# Patient Record
Sex: FEMALE | URBAN - METROPOLITAN AREA
[De-identification: names, ages, dates, MRNs, and addresses within clinical notes are randomized per-mention and may not be internally consistent; named-entity substitution may affect disease eponyms.]

---

## 2020-10-27 ENCOUNTER — OFFICE VISIT (OUTPATIENT)
Dept: URBAN - METROPOLITAN AREA CLINIC 13 | Facility: CLINIC | Age: 60
End: 2020-10-27
Payer: MEDICAID

## 2020-10-27 DIAGNOSIS — E11.3512 TYPE 2 DIABETES MELLITUS WITH PROLIFERATIVE DIABETIC RETINOPATHY WITH MACULAR EDEMA, LEFT EYE: ICD-10-CM

## 2020-10-27 PROCEDURE — 92134 CPTRZ OPH DX IMG PST SGM RTA: CPT | Performed by: OPHTHALMOLOGY

## 2020-10-27 PROCEDURE — 92014 COMPRE OPH EXAM EST PT 1/>: CPT | Performed by: OPHTHALMOLOGY

## 2020-10-27 PROCEDURE — 92235 FLUORESCEIN ANGRPH MLTIFRAME: CPT | Performed by: OPHTHALMOLOGY

## 2020-10-27 ASSESSMENT — INTRAOCULAR PRESSURE
OS: 18
OD: 17

## 2020-10-27 NOTE — IMPRESSION/PLAN
Impression: Puckering of macula, right eye: H35.371. Plan: There is an ERM on the surface of the retina. This is not affecting the fovea. No change seen since last visit. Will follow for thickening. Observe for now.

## 2020-10-27 NOTE — IMPRESSION/PLAN
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 OD. S/p PPV, MP, EL, intravitreal avastin x TRD OD 4/17/18 (SI) Plan: Retina attached on exam.  No new RD/RT x 360 deg.

## 2020-10-27 NOTE — IMPRESSION/PLAN
Impression: Unspecified pterygium of eye, bilateral: H11.003. Plan: Patient notes irritation has improved with use of ATs. Recommend patient continue with ATs PRN.

## 2020-10-27 NOTE — IMPRESSION/PLAN
Impression: Type 2 diab with prolif diab rtnop with macular edema, bi: [id number].
-s/p Avastin OU last 07/28/2020
-s/p Ozurdex OU last 04/16/19
-s/p fi PRP OD 2/4/2020, PRP OS #1/3 5/5/2020
-s/p PRP OS 05/05/2020 Plan: Exam and imaging demonstrate PDR with improved DME. IVFA from 10/27/2020 shows non perfusion OS>OD with improved NVE OS>OD s/p PRP OS. Recommend Avastin injection OU today to prevent worsening of the diabetic retinopathy. R/B/A's discussed. Patient understands and elects to proceed. Intravitreal injection of Avastin administered OU without complication.   

RTC: 2 months re-eval OCT OU, poss Avastin

## 2020-12-29 ENCOUNTER — OFFICE VISIT (OUTPATIENT)
Dept: URBAN - METROPOLITAN AREA CLINIC 13 | Facility: CLINIC | Age: 60
End: 2020-12-29
Payer: MEDICAID

## 2020-12-29 PROCEDURE — 92012 INTRM OPH EXAM EST PATIENT: CPT | Performed by: OPHTHALMOLOGY

## 2020-12-29 PROCEDURE — 92134 CPTRZ OPH DX IMG PST SGM RTA: CPT | Performed by: OPHTHALMOLOGY

## 2020-12-29 ASSESSMENT — INTRAOCULAR PRESSURE
OS: 18
OD: 20

## 2020-12-29 NOTE — IMPRESSION/PLAN
Impression: Type 2 diab with prolif diab rtnop with macular edema, bi: L50.1951.
-s/p Avastin OU last 10/27/2020
-s/p Ozurdex OU last 04/16/19
-s/p fi PRP OD 2/4/2020, PRP OS #1/3 5/5/2020
-s/p PRP OS 05/05/2020 Plan: Exam and OCT demonstrate PDR with worse DME after extending to 8 wks; patient has subjective decline over the past 2 wks with increased redness. IVFA from 10/27/2020 shows non perfusion OS>OD with improved NVE OS>OD s/p PRP OS. Recommend Avastin injection OU today to prevent worsening of the diabetic retinopathy and tapering to injections q 6 wks. R/B/A's discussed. Patient understands and elects to proceed. Intravitreal injection of Avastin administered OU without complication.   

RTC: 6 wks straight Avastin OU #1/3

## 2020-12-29 NOTE — IMPRESSION/PLAN
Impression: Unspecified pterygium of eye, bilateral: H11.003. Plan: Patient notes irritation has improved with use of ATs. Redness is worse. Recommend switch to PFATs PRN.

## 2020-12-29 NOTE — IMPRESSION/PLAN
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 OD. S/p PPV, MP, EL, intravitreal avastin x TRD OD 4/17/18 (SI) Plan: Retina attached on exam.  No new RD/RT x 360 deg.

## 2021-02-09 ENCOUNTER — PROCEDURE (OUTPATIENT)
Dept: URBAN - METROPOLITAN AREA CLINIC 13 | Facility: CLINIC | Age: 61
End: 2021-02-09
Payer: MEDICAID

## 2021-02-09 PROCEDURE — 92134 CPTRZ OPH DX IMG PST SGM RTA: CPT | Performed by: OPHTHALMOLOGY

## 2021-02-09 ASSESSMENT — INTRAOCULAR PRESSURE
OD: 14
OS: 12

## 2021-03-23 ENCOUNTER — PROCEDURE (OUTPATIENT)
Dept: URBAN - METROPOLITAN AREA CLINIC 13 | Facility: CLINIC | Age: 61
End: 2021-03-23
Payer: MEDICAID

## 2021-03-23 PROCEDURE — 92134 CPTRZ OPH DX IMG PST SGM RTA: CPT | Performed by: OPHTHALMOLOGY

## 2021-03-23 ASSESSMENT — INTRAOCULAR PRESSURE
OD: 17
OS: 15

## 2021-05-04 ENCOUNTER — PROCEDURE (OUTPATIENT)
Dept: URBAN - METROPOLITAN AREA CLINIC 13 | Facility: CLINIC | Age: 61
End: 2021-05-04
Payer: MEDICAID

## 2021-05-04 PROCEDURE — 92134 CPTRZ OPH DX IMG PST SGM RTA: CPT | Performed by: OPHTHALMOLOGY

## 2021-05-04 ASSESSMENT — INTRAOCULAR PRESSURE
OD: 18
OS: 18

## 2021-06-15 ENCOUNTER — OFFICE VISIT (OUTPATIENT)
Dept: URBAN - METROPOLITAN AREA CLINIC 13 | Facility: CLINIC | Age: 61
End: 2021-06-15
Payer: MEDICAID

## 2021-06-15 PROCEDURE — 92014 COMPRE OPH EXAM EST PT 1/>: CPT | Performed by: OPHTHALMOLOGY

## 2021-06-15 PROCEDURE — 92134 CPTRZ OPH DX IMG PST SGM RTA: CPT | Performed by: OPHTHALMOLOGY

## 2021-06-15 ASSESSMENT — INTRAOCULAR PRESSURE
OD: 21
OS: 24

## 2021-06-15 NOTE — IMPRESSION/PLAN
Impression: Type 2 diab with prolif diab rtnop with macular edema, bi: [id number].
-s/p Avastin OU last 5/4/2021
worse DME after extending to 8 wks in the past
-s/p Ozurdex OU last 04/16/19
-s/p fi PRP OD 2/4/2020, PRP OS #1/3 5/5/2020
-s/p PRP OS 05/05/2020 Plan: Exam and OCT demonstrate PDR with ; patient has subjective decline over the past 2 wks with increased redness. IVFA from 10/27/2020 shows non perfusion OS>OD with improved NVE OS>OD s/p PRP OS. Will obtain Optos FA at next visit. Recommend Avastin injection OU today to prevent worsening of the diabetic retinopathy and tapering to injections q 6 wks. R/B/A's discussed. Patient understands and elects to proceed. Intravitreal injection of Avastin administered OU without complication.   

RTC: 6 wks OCT OU; STRAIGHT AVASTIN OU #1/3,

## 2021-06-15 NOTE — IMPRESSION/PLAN
Impression: Glaucoma: H40.9. Plan: Exam demonstrates borderline IOP. Fortunately, there is no NVI today. ON appears healthy. Discussed surgery vs laser vs gtts vs observation.   Recommend CPM

## 2021-06-15 NOTE — IMPRESSION/PLAN
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 OD. S/p PPV, MP, EL, intravitreal avastin x TRD OD 4/17/18 (SI) Plan: Retina attached on exam.  No new RD/RT x 360 deg.

## 2021-06-15 NOTE — IMPRESSION/PLAN
Impression: Puckering of macula, both eyes: H35.373 Plan: There is an ERM on the surface of the retina OS>OD. This is not affecting the fovea. No change seen since last visit. Will follow for thickening. Observe for now.

## 2021-06-29 ENCOUNTER — OFFICE VISIT (OUTPATIENT)
Dept: URBAN - METROPOLITAN AREA CLINIC 13 | Facility: CLINIC | Age: 61
End: 2021-06-29
Payer: MEDICAID

## 2021-06-29 DIAGNOSIS — Z96.1 PRESENCE OF INTRAOCULAR LENS: ICD-10-CM

## 2021-06-29 PROCEDURE — 92012 INTRM OPH EXAM EST PATIENT: CPT | Performed by: OPHTHALMOLOGY

## 2021-06-29 PROCEDURE — 92134 CPTRZ OPH DX IMG PST SGM RTA: CPT | Performed by: OPHTHALMOLOGY

## 2021-06-29 ASSESSMENT — INTRAOCULAR PRESSURE
OS: 14
OD: 14

## 2021-06-29 NOTE — IMPRESSION/PLAN
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 OD. S/p PPV, MP, EL, intravitreal avastin x TRD OD 4/17/18 (SI) Plan: Retina attached on exam.  No new RD/RT x 360 deg.

## 2021-06-29 NOTE — IMPRESSION/PLAN
Impression: Type 2 diab with prolif diab rtnop with macular edema, bi: P11.4846.
-s/p Avastin OU last 06/15/2021
worse DME after extending to 8 wks in the past
-s/p Ozurdex OU last 04/16/19
-s/p fi PRP OD 2/4/2020, PRP OS #1/3 5/5/2020
-s/p PRP OS 05/05/2020 Plan: Patient notes blurry vision since last injection. Exam demonstrates PEE. No evidence of infection or worsening VH or worsening of [facility] Ats. Reassurance provided.   

RTC as scheduled

## 2021-07-27 ENCOUNTER — PROCEDURE (OUTPATIENT)
Dept: URBAN - METROPOLITAN AREA CLINIC 13 | Facility: CLINIC | Age: 61
End: 2021-07-27
Payer: MEDICAID

## 2021-07-27 PROCEDURE — 92134 CPTRZ OPH DX IMG PST SGM RTA: CPT | Performed by: OPHTHALMOLOGY

## 2021-07-27 ASSESSMENT — INTRAOCULAR PRESSURE
OS: 15
OD: 15

## 2021-09-07 ENCOUNTER — PROCEDURE (OUTPATIENT)
Dept: URBAN - METROPOLITAN AREA CLINIC 13 | Facility: CLINIC | Age: 61
End: 2021-09-07
Payer: MEDICAID

## 2021-09-07 PROCEDURE — 92134 CPTRZ OPH DX IMG PST SGM RTA: CPT | Performed by: OPHTHALMOLOGY

## 2021-09-07 ASSESSMENT — INTRAOCULAR PRESSURE
OD: 22
OS: 21

## 2021-10-19 ENCOUNTER — PROCEDURE (OUTPATIENT)
Dept: URBAN - METROPOLITAN AREA CLINIC 13 | Facility: CLINIC | Age: 61
End: 2021-10-19
Payer: MEDICAID

## 2021-10-19 PROCEDURE — 92134 CPTRZ OPH DX IMG PST SGM RTA: CPT | Performed by: OPHTHALMOLOGY

## 2021-10-19 ASSESSMENT — INTRAOCULAR PRESSURE
OD: 18
OS: 17

## 2021-11-30 ENCOUNTER — OFFICE VISIT (OUTPATIENT)
Dept: URBAN - METROPOLITAN AREA CLINIC 13 | Facility: CLINIC | Age: 61
End: 2021-11-30
Payer: MEDICAID

## 2021-11-30 DIAGNOSIS — H40.9 GLAUCOMA: ICD-10-CM

## 2021-11-30 DIAGNOSIS — E11.3521 TYPE 2 DIAB W PROLIF DIAB RTNOP W TRCTN DTCH MACULA, R EYE: ICD-10-CM

## 2021-11-30 DIAGNOSIS — H35.371 PUCKERING OF MACULA, RIGHT EYE: ICD-10-CM

## 2021-11-30 PROCEDURE — 92012 INTRM OPH EXAM EST PATIENT: CPT | Performed by: OPHTHALMOLOGY

## 2021-11-30 PROCEDURE — 92134 CPTRZ OPH DX IMG PST SGM RTA: CPT | Performed by: OPHTHALMOLOGY

## 2021-11-30 ASSESSMENT — INTRAOCULAR PRESSURE
OS: 16
OD: 19

## 2021-11-30 NOTE — IMPRESSION/PLAN
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 OD. S/p PPV, MP, EL, intravitreal avastin x TRD OD 4/17/18 (SI) Plan: Retina attached on exam.  No new RD/RT x 360 deg.

## 2021-11-30 NOTE — IMPRESSION/PLAN
Impression: Type 2 diab with prolif diab rtnop with macular edema, bi: [id number].
-s/p Avastin OU last 5/4/2021
worse DME after extending to 8 wks in the past
-s/p Ozurdex OU last 04/16/19
-s/p fi PRP OD 2/4/2020, PRP OS #1/3 5/5/2020
-s/p PRP OS 05/05/2020 Plan: Exam and OCT demonstrate PDR with ; patient has subjective decline over the past 2 wks with increased redness. IVFA from 10/27/2020 shows non perfusion OS>OD with improved NVE OS>OD s/p PRP OS. Recommend Avastin injection OU today to prevent worsening of the diabetic retinopathy and tapering to injections q 4-5 wks. R/B/A's discussed. Patient understands and elects to proceed. Intravitreal injection of Avastin administered OU without complication.   

RTC: 4-5 wks OCT OU; STRAIGHT AVASTIN OU #1/3, after series, next eval will be with Optos FA OS>OD

## 2022-01-04 ENCOUNTER — PROCEDURE (OUTPATIENT)
Dept: URBAN - METROPOLITAN AREA CLINIC 13 | Facility: CLINIC | Age: 62
End: 2022-01-04
Payer: MEDICAID

## 2022-01-04 PROCEDURE — 92134 CPTRZ OPH DX IMG PST SGM RTA: CPT | Performed by: OPHTHALMOLOGY

## 2022-01-04 ASSESSMENT — INTRAOCULAR PRESSURE
OS: 19
OD: 19

## 2022-02-01 ENCOUNTER — PROCEDURE (OUTPATIENT)
Dept: URBAN - METROPOLITAN AREA CLINIC 13 | Facility: CLINIC | Age: 62
End: 2022-02-01
Payer: MEDICAID

## 2022-02-01 PROCEDURE — 92134 CPTRZ OPH DX IMG PST SGM RTA: CPT | Performed by: OPHTHALMOLOGY

## 2022-02-01 ASSESSMENT — INTRAOCULAR PRESSURE
OS: 14
OD: 14

## 2022-03-01 ENCOUNTER — PROCEDURE (OUTPATIENT)
Dept: URBAN - METROPOLITAN AREA CLINIC 13 | Facility: CLINIC | Age: 62
End: 2022-03-01
Payer: MEDICAID

## 2022-03-01 PROCEDURE — 92134 CPTRZ OPH DX IMG PST SGM RTA: CPT | Performed by: OPHTHALMOLOGY

## 2022-03-01 ASSESSMENT — INTRAOCULAR PRESSURE
OD: 18
OS: 18

## 2022-03-29 ENCOUNTER — OFFICE VISIT (OUTPATIENT)
Dept: URBAN - METROPOLITAN AREA CLINIC 13 | Facility: CLINIC | Age: 62
End: 2022-03-29
Payer: MEDICAID

## 2022-03-29 DIAGNOSIS — E11.3513 TYPE 2 DIAB WITH PROLIF DIAB RTNOP WITH MACULAR EDEMA, BI: ICD-10-CM

## 2022-03-29 DIAGNOSIS — H11.003 UNSPECIFIED PTERYGIUM OF EYE, BILATERAL: Primary | ICD-10-CM

## 2022-03-29 PROCEDURE — 92235 FLUORESCEIN ANGRPH MLTIFRAME: CPT | Performed by: OPHTHALMOLOGY

## 2022-03-29 PROCEDURE — 99214 OFFICE O/P EST MOD 30 MIN: CPT | Performed by: OPHTHALMOLOGY

## 2022-03-29 PROCEDURE — 92134 CPTRZ OPH DX IMG PST SGM RTA: CPT | Performed by: OPHTHALMOLOGY

## 2022-03-29 PROCEDURE — 92250 FUNDUS PHOTOGRAPHY W/I&R: CPT | Performed by: OPHTHALMOLOGY

## 2022-03-29 ASSESSMENT — INTRAOCULAR PRESSURE
OD: 17
OS: 14

## 2022-03-29 NOTE — IMPRESSION/PLAN
Impression: Type 2 diab with prolif diab rtnop with macular edema, bi: L46.8057.
-s/p Avastin OU last 5/4/2021
worse DME after extending to 8 wks in the past
-s/p Ozurdex OU last 04/16/19
-s/p fi PRP OD 2/4/2020, PRP OS #1/3 5/5/2020
-s/p PRP OS 05/05/2020 Plan: Exam and OCT demonstrate PDR with improving DME. IVFA from 3/29/2022 shows non perfusion OS>OD with improved NVE OS>OD s/p PRP OU. Recommend Avastin injection OU today to prevent worsening of the diabetic retinopathy and tapering to injections q 4-5 wks. R/B/A's discussed. Patient understands and elects to proceed. Intravitreal injection of Avastin administered OU without complication.   

RTC: 4-5 wks OCT OU; STRAIGHT AVASTIN OU #1/3

## 2022-03-29 NOTE — IMPRESSION/PLAN
Impression: Puckering of macula, both eyes: H35.373 Plan: There is an ERM on the surface of the retina OS>OD. This is not affecting the fovea. No change seen since last visit. Will follow for thickening. Continue observation for now.

## 2022-03-29 NOTE — IMPRESSION/PLAN
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 OD. S/p PPV, MP, EL, intravitreal avastin x TRD OD 4/17/18 (SI) Plan: Retina attached on exam.  No new RD/RT x 360 deg.

## 2022-05-03 ENCOUNTER — PROCEDURE (OUTPATIENT)
Dept: URBAN - METROPOLITAN AREA CLINIC 13 | Facility: CLINIC | Age: 62
End: 2022-05-03
Payer: MEDICAID

## 2022-05-03 DIAGNOSIS — E11.3513 TYPE 2 DIABETES MELLITUS WITH PROLIFERATIVE DIABETIC RETINOPATHY WITH MACULAR EDEMA, BILATERAL: Primary | ICD-10-CM

## 2022-05-03 PROCEDURE — 92134 CPTRZ OPH DX IMG PST SGM RTA: CPT | Performed by: OPHTHALMOLOGY

## 2022-05-03 ASSESSMENT — INTRAOCULAR PRESSURE
OD: 14
OS: 14

## 2022-05-31 ENCOUNTER — PROCEDURE (OUTPATIENT)
Dept: URBAN - METROPOLITAN AREA CLINIC 13 | Facility: CLINIC | Age: 62
End: 2022-05-31
Payer: MEDICAID

## 2022-05-31 DIAGNOSIS — E11.3513 TYPE 2 DIABETES MELLITUS WITH PROLIFERATIVE DIABETIC RETINOPATHY WITH MACULAR EDEMA, BILATERAL: Primary | ICD-10-CM

## 2022-05-31 PROCEDURE — 92134 CPTRZ OPH DX IMG PST SGM RTA: CPT | Performed by: OPHTHALMOLOGY

## 2022-05-31 ASSESSMENT — INTRAOCULAR PRESSURE
OD: 14
OS: 12

## 2022-07-05 ENCOUNTER — PROCEDURE (OUTPATIENT)
Dept: URBAN - METROPOLITAN AREA CLINIC 13 | Facility: CLINIC | Age: 62
End: 2022-07-05
Payer: MEDICAID

## 2022-07-05 DIAGNOSIS — E11.3513 TYPE 2 DIABETES MELLITUS WITH PROLIFERATIVE DIABETIC RETINOPATHY WITH MACULAR EDEMA, BILATERAL: Primary | ICD-10-CM

## 2022-07-05 PROCEDURE — 92134 CPTRZ OPH DX IMG PST SGM RTA: CPT | Performed by: OPHTHALMOLOGY

## 2022-07-05 ASSESSMENT — INTRAOCULAR PRESSURE
OD: 17
OS: 15

## 2022-08-02 ENCOUNTER — OFFICE VISIT (OUTPATIENT)
Dept: URBAN - METROPOLITAN AREA CLINIC 13 | Facility: CLINIC | Age: 62
End: 2022-08-02
Payer: MEDICAID

## 2022-08-02 DIAGNOSIS — H35.373 PUCKERING OF MACULA, BILATERAL: ICD-10-CM

## 2022-08-02 DIAGNOSIS — H43.812 VITREOUS DEGENERATION, LEFT EYE: Primary | ICD-10-CM

## 2022-08-02 DIAGNOSIS — H11.003 UNSPECIFIED PTERYGIUM OF EYE, BILATERAL: ICD-10-CM

## 2022-08-02 DIAGNOSIS — H40.9 GLAUCOMA: ICD-10-CM

## 2022-08-02 DIAGNOSIS — E11.3521 TYPE 2 DIAB W PROLIF DIAB RTNOP W TRCTN DTCH MACULA, R EYE: ICD-10-CM

## 2022-08-02 DIAGNOSIS — E11.3513 TYPE 2 DIAB WITH PROLIF DIAB RTNOP WITH MACULAR EDEMA, BI: ICD-10-CM

## 2022-08-02 PROCEDURE — 92134 CPTRZ OPH DX IMG PST SGM RTA: CPT | Performed by: OPHTHALMOLOGY

## 2022-08-02 PROCEDURE — 92014 COMPRE OPH EXAM EST PT 1/>: CPT | Performed by: OPHTHALMOLOGY

## 2022-08-02 ASSESSMENT — INTRAOCULAR PRESSURE
OS: 18
OD: 15

## 2022-08-02 NOTE — IMPRESSION/PLAN
Impression: Puckering of macula, bilateral: H35.373. Plan: There is an ERM on the surface of the retina OS>OD. This is not affecting the fovea. No change seen since last visit. Will follow for thickening. Continue observation for now.

## 2022-08-02 NOTE — IMPRESSION/PLAN
Impression: Glaucoma Suspect: H40.9. Plan: Exam demonstrates possible mild glaucomatous cupping. Fortunately, there is no NVI today. IOP is WNL and there is no progression of ON cupping. Discussed surgery vs laser vs gtts vs observation.   Recommend eval

## 2022-08-02 NOTE — IMPRESSION/PLAN
Impression: Type 2 diab with prolif diab rtnop with macular edema, bi: J03.6347.
-s/p Avastin OU last 07/05/2022
worse DME after extending to 8 wks in the past
-s/p Ozurdex OU last 04/16/19
-s/p fi PRP OD 2/4/2020, PRP OS #1/3 5/5/2020
-s/p PRP OS 05/05/2020 Plan: Exam and OCT demonstrate PDR with improving DME. IVFA from 3/29/2022 shows non perfusion OS>OD with improved NVE OS>OD s/p PRP OU. Recommend Avastin injection OU today to prevent worsening of the diabetic retinopathy and tapering to injections q 4-5 wks. R/B/A's discussed. Patient understands and elects to proceed. Intravitreal injection of Avastin administered OU without complication.   

RTC: 4-5 wks OCT OU; STRAIGHT AVASTIN OU #1/3

## 2022-08-02 NOTE — IMPRESSION/PLAN
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 OD. S/p PPV, MP, EL, intravitreal avastin x TRD OD 4/17/18 (SI) Plan: Retina attached on exam.  No new RD/RT x 360 deg.

## 2022-08-02 NOTE — IMPRESSION/PLAN
Impression: Vitreous degeneration, left eye: H43.812. Plan: Patient notes floaters since last injection. Exam demonstrates a PVD without any RD or RT on exam.  Discussed R/B/A of PPV vs observation for the floaters. The floaters are not debilitating and so observation was recommended. Reassurance provided. RDW discussed. Precautions discussed with the patient.

## 2022-08-30 ENCOUNTER — PROCEDURE (OUTPATIENT)
Dept: URBAN - METROPOLITAN AREA CLINIC 13 | Facility: CLINIC | Age: 62
End: 2022-08-30
Payer: MEDICAID

## 2022-08-30 DIAGNOSIS — E11.3513 TYPE 2 DIABETES MELLITUS WITH PROLIFERATIVE DIABETIC RETINOPATHY WITH MACULAR EDEMA, BILATERAL: Primary | ICD-10-CM

## 2022-08-30 ASSESSMENT — INTRAOCULAR PRESSURE
OS: 12
OD: 13

## 2022-09-27 ENCOUNTER — PROCEDURE (OUTPATIENT)
Dept: URBAN - METROPOLITAN AREA CLINIC 13 | Facility: CLINIC | Age: 62
End: 2022-09-27
Payer: MEDICAID

## 2022-09-27 DIAGNOSIS — E11.3513 TYPE 2 DIABETES MELLITUS WITH PROLIFERATIVE DIABETIC RETINOPATHY WITH MACULAR EDEMA, BILATERAL: Primary | ICD-10-CM

## 2022-09-27 PROCEDURE — 92134 CPTRZ OPH DX IMG PST SGM RTA: CPT | Performed by: OPHTHALMOLOGY

## 2022-09-27 ASSESSMENT — INTRAOCULAR PRESSURE
OS: 13
OD: 15

## 2022-10-25 ENCOUNTER — PROCEDURE (OUTPATIENT)
Dept: URBAN - METROPOLITAN AREA CLINIC 13 | Facility: CLINIC | Age: 62
End: 2022-10-25
Payer: MEDICAID

## 2022-10-25 DIAGNOSIS — E11.3513 TYPE 2 DIABETES MELLITUS WITH PROLIFERATIVE DIABETIC RETINOPATHY WITH MACULAR EDEMA, BILATERAL: Primary | ICD-10-CM

## 2022-10-25 PROCEDURE — 67028 INJECTION EYE DRUG: CPT | Performed by: OPHTHALMOLOGY

## 2022-10-25 PROCEDURE — 92134 CPTRZ OPH DX IMG PST SGM RTA: CPT | Performed by: OPHTHALMOLOGY

## 2022-10-25 ASSESSMENT — INTRAOCULAR PRESSURE
OS: 12
OD: 14

## 2022-11-22 ENCOUNTER — OFFICE VISIT (OUTPATIENT)
Dept: URBAN - METROPOLITAN AREA CLINIC 13 | Facility: CLINIC | Age: 62
End: 2022-11-22
Payer: MEDICAID

## 2022-11-22 DIAGNOSIS — H11.003 UNSPECIFIED PTERYGIUM OF EYE, BILATERAL: ICD-10-CM

## 2022-11-22 DIAGNOSIS — H43.812 VITREOUS DEGENERATION, LEFT EYE: ICD-10-CM

## 2022-11-22 DIAGNOSIS — H40.9 GLAUCOMA: ICD-10-CM

## 2022-11-22 DIAGNOSIS — E11.3513 TYPE 2 DIAB WITH PROLIF DIAB RTNOP WITH MACULAR EDEMA, BI: Primary | ICD-10-CM

## 2022-11-22 DIAGNOSIS — E11.3521 TYPE 2 DIAB W PROLIF DIAB RTNOP W TRCTN DTCH MACULA, R EYE: ICD-10-CM

## 2022-11-22 DIAGNOSIS — H35.373 PUCKERING OF MACULA, BILATERAL: ICD-10-CM

## 2022-11-22 PROCEDURE — 92134 CPTRZ OPH DX IMG PST SGM RTA: CPT | Performed by: OPHTHALMOLOGY

## 2022-11-22 PROCEDURE — 92014 COMPRE OPH EXAM EST PT 1/>: CPT | Performed by: OPHTHALMOLOGY

## 2022-11-22 ASSESSMENT — INTRAOCULAR PRESSURE
OS: 19
OD: 20

## 2022-11-22 NOTE — IMPRESSION/PLAN
Impression: Type 2 diab with prolif diab rtnop with macular edema, bi: H56.1995.
-s/p Avastin OU last 10/25/2022
worse DME after extending to 8 wks in the past
-s/p Ozurdex OU last 04/16/19
-s/p fi PRP OD 2/4/2020, PRP OS #1/3 5/5/2020
-s/p PRP OS 05/05/2020 Plan: Exam and OCT demonstrate PDR with improving DME. IVFA from 3/29/2022 shows non perfusion OS>OD with improved NVE OS>OD s/p PRP OU. Recommend Avastin injection OU today to prevent worsening of the diabetic retinopathy and tapering to injections q 4-5 wks. R/B/A's discussed. Patient understands and elects to proceed. Intravitreal injection of Avastin administered OU without complication.   

RTC: 4-5 wks OCT OU; STRAIGHT AVASTIN OU #1/3

## 2022-11-22 NOTE — IMPRESSION/PLAN
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 OD. S/p PPV, MP, EL, intravitreal avastin x TRD OD 4/17/18 (SI) Plan: Retina attached on exam.  No new RD/RT x 360 deg.

## 2022-12-27 ENCOUNTER — PROCEDURE (OUTPATIENT)
Dept: URBAN - METROPOLITAN AREA CLINIC 13 | Facility: CLINIC | Age: 62
End: 2022-12-27
Payer: MEDICAID

## 2022-12-27 DIAGNOSIS — E11.3513 TYPE 2 DIABETES MELLITUS WITH PROLIFERATIVE DIABETIC RETINOPATHY WITH MACULAR EDEMA, BILATERAL: Primary | ICD-10-CM

## 2022-12-27 PROCEDURE — 92134 CPTRZ OPH DX IMG PST SGM RTA: CPT | Performed by: OPHTHALMOLOGY

## 2022-12-27 ASSESSMENT — INTRAOCULAR PRESSURE
OD: 16
OS: 17

## 2023-01-24 ENCOUNTER — PROCEDURE (OUTPATIENT)
Dept: URBAN - METROPOLITAN AREA CLINIC 13 | Facility: CLINIC | Age: 63
End: 2023-01-24
Payer: MEDICAID

## 2023-01-24 DIAGNOSIS — E11.3513 TYPE 2 DIABETES MELLITUS WITH PROLIFERATIVE DIABETIC RETINOPATHY WITH MACULAR EDEMA, BILATERAL: Primary | ICD-10-CM

## 2023-01-24 PROCEDURE — 92134 CPTRZ OPH DX IMG PST SGM RTA: CPT | Performed by: OPHTHALMOLOGY

## 2023-01-24 ASSESSMENT — INTRAOCULAR PRESSURE
OD: 13
OS: 12

## 2023-02-21 ENCOUNTER — OFFICE VISIT (OUTPATIENT)
Dept: URBAN - METROPOLITAN AREA CLINIC 13 | Facility: CLINIC | Age: 63
End: 2023-02-21
Payer: MEDICAID

## 2023-02-21 DIAGNOSIS — E11.3513 TYPE 2 DIABETES MELLITUS WITH PROLIFERATIVE DIABETIC RETINOPATHY WITH MACULAR EDEMA, BILATERAL: Primary | ICD-10-CM

## 2023-02-21 PROCEDURE — 92134 CPTRZ OPH DX IMG PST SGM RTA: CPT | Performed by: OPHTHALMOLOGY

## 2023-02-21 ASSESSMENT — INTRAOCULAR PRESSURE
OS: 15
OD: 18

## 2023-03-28 ENCOUNTER — OFFICE VISIT (OUTPATIENT)
Dept: URBAN - METROPOLITAN AREA CLINIC 13 | Facility: CLINIC | Age: 63
End: 2023-03-28
Payer: MEDICAID

## 2023-03-28 DIAGNOSIS — E11.3513 TYPE 2 DIABETES MELLITUS WITH PROLIFERATIVE DIABETIC RETINOPATHY WITH MACULAR EDEMA, BILATERAL: Primary | ICD-10-CM

## 2023-03-28 DIAGNOSIS — H35.373 PUCKERING OF MACULA, BILATERAL: ICD-10-CM

## 2023-03-28 DIAGNOSIS — E11.3521 TYPE 2 DIAB W PROLIF DIAB RTNOP W TRCTN DTCH MACULA, R EYE: ICD-10-CM

## 2023-03-28 DIAGNOSIS — H11.003 UNSPECIFIED PTERYGIUM OF EYE, BILATERAL: ICD-10-CM

## 2023-03-28 DIAGNOSIS — H40.9 GLAUCOMA: ICD-10-CM

## 2023-03-28 PROCEDURE — 92134 CPTRZ OPH DX IMG PST SGM RTA: CPT | Performed by: OPHTHALMOLOGY

## 2023-03-28 PROCEDURE — 92012 INTRM OPH EXAM EST PATIENT: CPT | Performed by: OPHTHALMOLOGY

## 2023-03-28 ASSESSMENT — INTRAOCULAR PRESSURE
OS: 15
OD: 18

## 2023-03-28 NOTE — IMPRESSION/PLAN
Impression: Unspecified pterygium of eye, bilateral: H11.003. Plan: Patient notes irritation has improved with use of ATs. Redness is improved. Cont switch to PFATs PRN.

## 2023-03-28 NOTE — IMPRESSION/PLAN
Impression: Glaucoma Suspect: H40.9. Plan: Exam demonstrates possible mild glaucomatous cupping. Fortunately, there is no NVI today. IOP is WNL and there is no progression of ON cupping. Discussed surgery vs laser vs gtts vs observation. She was started on drops.  CPM

## 2023-03-28 NOTE — IMPRESSION/PLAN
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 OD. S/p PPV, MP, EL, intravitreal avastin x TRD OD 4/17/18 (SI) Plan: Retina attached on exam.  No new RD/RT x 360 deg.

## 2023-03-28 NOTE — IMPRESSION/PLAN
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
-s/p Avastin OU last 2/21/2023
worse DME after extending to 8 wks in the past
-s/p Ozurdex OU last 04/16/19
-s/p fi PRP OD 2/4/2020, PRP OS #1/3 5/5/2020
-s/p PRP OS 05/05/2020 Plan: Patient notes new VH OS. Exam and OCT demonstrate PDR with persistent DME and tr VH OU. IVFA from 3/29/2022 shows non perfusion OS>OD with improved NVE OS>OD s/p PRP OU. Recommend Avastin injection OU today and switch to [employer] due to worsening DR even on Avastin. R/B/A's discussed. Patient understands and elects to proceed. Intravitreal injection of Avastin administered OU without complication.   

RTC: 4-5 wks OCT OU; STRAIGHT [place] OU #1/3

## 2023-04-25 ENCOUNTER — PROCEDURE (OUTPATIENT)
Dept: URBAN - METROPOLITAN AREA CLINIC 13 | Facility: CLINIC | Age: 63
End: 2023-04-25
Payer: MEDICAID

## 2023-04-25 DIAGNOSIS — E11.3513 TYPE 2 DIABETES MELLITUS WITH PROLIFERATIVE DIABETIC RETINOPATHY WITH MACULAR EDEMA, BILATERAL: Primary | ICD-10-CM

## 2023-04-25 PROCEDURE — 92134 CPTRZ OPH DX IMG PST SGM RTA: CPT | Performed by: OPHTHALMOLOGY

## 2023-04-25 ASSESSMENT — INTRAOCULAR PRESSURE
OD: 16
OS: 13

## 2023-05-23 ENCOUNTER — PROCEDURE (OUTPATIENT)
Dept: URBAN - METROPOLITAN AREA CLINIC 13 | Facility: CLINIC | Age: 63
End: 2023-05-23
Payer: MEDICAID

## 2023-05-23 DIAGNOSIS — E11.3513 TYPE 2 DIABETES MELLITUS WITH PROLIFERATIVE DIABETIC RETINOPATHY WITH MACULAR EDEMA, BILATERAL: Primary | ICD-10-CM

## 2023-05-23 PROCEDURE — 92134 CPTRZ OPH DX IMG PST SGM RTA: CPT | Performed by: OPHTHALMOLOGY

## 2023-05-23 PROCEDURE — 92133 CPTRZD OPH DX IMG PST SGM ON: CPT | Performed by: OPHTHALMOLOGY

## 2023-05-23 ASSESSMENT — INTRAOCULAR PRESSURE
OD: 20
OS: 20

## 2023-06-20 ENCOUNTER — PROCEDURE (OUTPATIENT)
Dept: URBAN - METROPOLITAN AREA CLINIC 13 | Facility: CLINIC | Age: 63
End: 2023-06-20
Payer: MEDICAID

## 2023-06-20 DIAGNOSIS — E11.3513 TYPE 2 DIABETES MELLITUS WITH PROLIFERATIVE DIABETIC RETINOPATHY WITH MACULAR EDEMA, BILATERAL: Primary | ICD-10-CM

## 2023-06-20 PROCEDURE — 92134 CPTRZ OPH DX IMG PST SGM RTA: CPT | Performed by: OPHTHALMOLOGY

## 2023-06-20 ASSESSMENT — INTRAOCULAR PRESSURE
OS: 15
OD: 17

## 2023-07-18 ENCOUNTER — OFFICE VISIT (OUTPATIENT)
Dept: URBAN - METROPOLITAN AREA CLINIC 13 | Facility: CLINIC | Age: 63
End: 2023-07-18
Payer: MEDICAID

## 2023-07-18 DIAGNOSIS — H11.003 UNSPECIFIED PTERYGIUM OF EYE, BILATERAL: ICD-10-CM

## 2023-07-18 DIAGNOSIS — H35.373 PUCKERING OF MACULA, BILATERAL: ICD-10-CM

## 2023-07-18 PROCEDURE — 92014 COMPRE OPH EXAM EST PT 1/>: CPT | Performed by: OPHTHALMOLOGY

## 2023-07-18 PROCEDURE — 92134 CPTRZ OPH DX IMG PST SGM RTA: CPT | Performed by: OPHTHALMOLOGY

## 2023-07-18 ASSESSMENT — INTRAOCULAR PRESSURE
OD: 19
OS: 14

## 2023-07-18 NOTE — IMPRESSION/PLAN
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 OD. S/p PPV, MP, EL, intravitreal avastin x TRD OD 4/17/18 (SI) Plan: Retina attached on exam.  No new RD/RT x 360 deg.

## 2023-07-18 NOTE — IMPRESSION/PLAN
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
-s/p fi PRP OD 2/4/2020
-s/p PRP OS 05/05/2020
-s/p Avastin OU  04/25/2023~worse DME after extending to 8 wks in the past
-s/p Eylea OU 06/20/2023
-s/p Ozurdex OU last 04/16/19 Plan: Patient notes VH OS is improved. Exam and OCT demonstrate PDR with persistent DME and tr VH OU. IVFA from 3/29/2022 shows non perfusion OS>OD with improved NVE OS>OD s/p PRP OU. Recommend Eylea (h/o worsening DR even on Avastin) and extend to 6 wks. R/B/A's discussed. Patient understands and elects to proceed. Intravitreal injection of Eylea administered OU without complication.   

RTC: 6 wks OCT OU; STRAIGHT Eylea OU #1/3

## 2023-07-18 NOTE — IMPRESSION/PLAN
Impression: Vitreous hemorrhage, left eye: H43.12. Plan: Exam confirms improving vitreous hemorrhage in the left eye, secondary to PDR. Discussed R/B/A's of observation vs. PPV vs. Anti-VEGF injection. Recommend AntiVEGF injection today. Patient to sleep with elevation. May consider PPV if does not resolve on its own.

## 2023-07-22 ENCOUNTER — OFFICE VISIT (OUTPATIENT)
Dept: URBAN - METROPOLITAN AREA CLINIC 54 | Facility: CLINIC | Age: 63
End: 2023-07-22
Payer: COMMERCIAL

## 2023-07-22 DIAGNOSIS — E11.3521 TYPE 2 DIAB W PROLIF DIAB RTNOP W TRCTN DTCH MACULA, R EYE: ICD-10-CM

## 2023-07-22 DIAGNOSIS — E11.3513 TYPE 2 DIABETES MELLITUS WITH PROLIFERATIVE DIABETIC RETINOPATHY WITH MACULAR EDEMA, BILATERAL: ICD-10-CM

## 2023-07-22 DIAGNOSIS — H11.31 SUBCONJUNCTIVAL HEMORRHAGE OF RIGHT EYE: Primary | ICD-10-CM

## 2023-07-22 DIAGNOSIS — H43.12 VITREOUS HEMORRHAGE, LEFT EYE: ICD-10-CM

## 2023-07-22 DIAGNOSIS — H40.9 GLAUCOMA: ICD-10-CM

## 2023-07-22 PROCEDURE — 92134 CPTRZ OPH DX IMG PST SGM RTA: CPT | Performed by: OPHTHALMOLOGY

## 2023-07-22 PROCEDURE — 99213 OFFICE O/P EST LOW 20 MIN: CPT | Performed by: OPHTHALMOLOGY

## 2023-07-22 ASSESSMENT — INTRAOCULAR PRESSURE
OS: 17
OD: 19

## 2023-07-22 NOTE — IMPRESSION/PLAN
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 OD. S/p PPV, MP, EL, intravitreal avastin x TRD OD 4/17/18 (SI) Plan: Retina attached on exam.  No new RD/RT x 360 deg.

## 2023-07-22 NOTE — IMPRESSION/PLAN
Impression: Type 2 diab with prolif diab rtnop with macular edema, bi: X95.6413.
-s/p fi PRP OD 2/4/2020
-s/p PRP OS 05/05/2020
-s/p Avastin OU  04/25/2023~worse DME after extending to 8 wks in the past
-s/p Eylea OU 06/20/2023
-s/p Ozurdex OU last 04/16/19 Plan: s/p Eylea injection OU on 7/18/23. Exam/OCT reveals no CSDME OU.

## 2023-07-22 NOTE — IMPRESSION/PLAN
Impression: Subconjunctival hemorrhage of right eye: H11.31. Plan: [facility] temporally OD. Discussed diagnosis and natural history of condition. Use artificial tears PRN. Return as scheduled with Dr. Sarabjit Henriquez RTC: 6 wks OCT OU; STRAIGHT Eylea OU #1/3 a scheduled

## 2023-08-01 ENCOUNTER — OFFICE VISIT (OUTPATIENT)
Dept: URBAN - METROPOLITAN AREA CLINIC 13 | Facility: CLINIC | Age: 63
End: 2023-08-01
Payer: MEDICAID

## 2023-08-01 DIAGNOSIS — E11.3513 TYPE 2 DIAB WITH PROLIF DIAB RTNOP WITH MACULAR EDEMA, BI: ICD-10-CM

## 2023-08-01 DIAGNOSIS — H11.31 SUBCONJUNCTIVAL HEMORRHAGE OF RIGHT EYE: Primary | ICD-10-CM

## 2023-08-01 DIAGNOSIS — E11.3521 TYPE 2 DIAB W PROLIF DIAB RTNOP W TRCTN DTCH MACULA, R EYE: ICD-10-CM

## 2023-08-01 DIAGNOSIS — H43.12 VITREOUS HEMORRHAGE, LEFT EYE: ICD-10-CM

## 2023-08-01 DIAGNOSIS — H40.9 GLAUCOMA: ICD-10-CM

## 2023-08-01 DIAGNOSIS — H35.373 PUCKERING OF MACULA, BILATERAL: ICD-10-CM

## 2023-08-01 DIAGNOSIS — H11.003 UNSPECIFIED PTERYGIUM OF EYE, BILATERAL: ICD-10-CM

## 2023-08-01 PROCEDURE — 92134 CPTRZ OPH DX IMG PST SGM RTA: CPT | Performed by: OPHTHALMOLOGY

## 2023-08-01 PROCEDURE — 92014 COMPRE OPH EXAM EST PT 1/>: CPT | Performed by: OPHTHALMOLOGY

## 2023-08-01 ASSESSMENT — INTRAOCULAR PRESSURE
OD: 20
OS: 16

## 2023-09-05 ENCOUNTER — PROCEDURE (OUTPATIENT)
Dept: URBAN - METROPOLITAN AREA CLINIC 13 | Facility: CLINIC | Age: 63
End: 2023-09-05
Payer: MEDICAID

## 2023-09-05 DIAGNOSIS — E11.3513 TYPE 2 DIABETES MELLITUS WITH PROLIFERATIVE DIABETIC RETINOPATHY WITH MACULAR EDEMA, BILATERAL: Primary | ICD-10-CM

## 2023-09-05 PROCEDURE — 92134 CPTRZ OPH DX IMG PST SGM RTA: CPT | Performed by: OPHTHALMOLOGY

## 2023-09-05 ASSESSMENT — INTRAOCULAR PRESSURE
OS: 17
OD: 19

## 2023-10-17 ENCOUNTER — OFFICE VISIT (OUTPATIENT)
Dept: URBAN - METROPOLITAN AREA CLINIC 13 | Facility: CLINIC | Age: 63
End: 2023-10-17
Payer: MEDICAID

## 2023-10-17 DIAGNOSIS — E11.3513 TYPE 2 DIABETES MELLITUS WITH PROLIFERATIVE DIABETIC RETINOPATHY WITH MACULAR EDEMA, BILATERAL: Primary | ICD-10-CM

## 2023-10-17 PROCEDURE — 92134 CPTRZ OPH DX IMG PST SGM RTA: CPT | Performed by: OPHTHALMOLOGY

## 2023-10-17 ASSESSMENT — INTRAOCULAR PRESSURE
OD: 20
OS: 15

## 2024-04-09 ENCOUNTER — OFFICE VISIT (OUTPATIENT)
Dept: URBAN - METROPOLITAN AREA CLINIC 13 | Facility: CLINIC | Age: 64
End: 2024-04-09
Payer: MEDICAID

## 2024-04-09 DIAGNOSIS — H11.31 SUBCONJUNCTIVAL HEMORRHAGE OF RIGHT EYE: ICD-10-CM

## 2024-04-09 DIAGNOSIS — H40.9 GLAUCOMA: ICD-10-CM

## 2024-04-09 DIAGNOSIS — E11.3521 TYPE 2 DIAB W PROLIF DIAB RTNOP W TRCTN DTCH MACULA, R EYE: ICD-10-CM

## 2024-04-09 DIAGNOSIS — E11.3513 TYPE 2 DIABETES MELLITUS WITH PROLIFERATIVE DIABETIC RETINOPATHY WITH MACULAR EDEMA, BILATERAL: Primary | ICD-10-CM

## 2024-04-09 DIAGNOSIS — H35.373 PUCKERING OF MACULA, BILATERAL: ICD-10-CM

## 2024-04-09 DIAGNOSIS — H43.12 VITREOUS HEMORRHAGE, LEFT EYE: ICD-10-CM

## 2024-04-09 DIAGNOSIS — H11.003 UNSPECIFIED PTERYGIUM OF EYE, BILATERAL: ICD-10-CM

## 2024-04-09 PROCEDURE — 92014 COMPRE OPH EXAM EST PT 1/>: CPT | Performed by: OPHTHALMOLOGY

## 2024-04-09 PROCEDURE — 67028 INJECTION EYE DRUG: CPT | Performed by: OPHTHALMOLOGY

## 2024-04-09 PROCEDURE — 92134 CPTRZ OPH DX IMG PST SGM RTA: CPT | Performed by: OPHTHALMOLOGY

## 2024-04-09 ASSESSMENT — INTRAOCULAR PRESSURE
OS: 15
OD: 19

## 2024-11-26 ENCOUNTER — OFFICE VISIT (OUTPATIENT)
Dept: URBAN - METROPOLITAN AREA CLINIC 13 | Facility: CLINIC | Age: 64
End: 2024-11-26
Payer: MEDICAID

## 2024-11-26 DIAGNOSIS — H40.9 GLAUCOMA: ICD-10-CM

## 2024-11-26 DIAGNOSIS — H11.31 SUBCONJUNCTIVAL HEMORRHAGE OF RIGHT EYE: ICD-10-CM

## 2024-11-26 DIAGNOSIS — H35.373 PUCKERING OF MACULA, BILATERAL: ICD-10-CM

## 2024-11-26 DIAGNOSIS — E11.3521 TYPE 2 DIAB W PROLIF DIAB RTNOP W TRCTN DTCH MACULA, R EYE: ICD-10-CM

## 2024-11-26 DIAGNOSIS — E11.3513 TYPE 2 DIABETES MELLITUS WITH PROLIFERATIVE DIABETIC RETINOPATHY WITH MACULAR EDEMA, BILATERAL: Primary | ICD-10-CM

## 2024-11-26 DIAGNOSIS — H11.003 UNSPECIFIED PTERYGIUM OF EYE, BILATERAL: ICD-10-CM

## 2024-11-26 PROCEDURE — 92014 COMPRE OPH EXAM EST PT 1/>: CPT | Performed by: OPHTHALMOLOGY

## 2024-11-26 PROCEDURE — 92134 CPTRZ OPH DX IMG PST SGM RTA: CPT | Performed by: OPHTHALMOLOGY

## 2024-11-26 ASSESSMENT — INTRAOCULAR PRESSURE
OS: 16
OD: 14

## 2025-02-25 ENCOUNTER — OFFICE VISIT (OUTPATIENT)
Dept: URBAN - METROPOLITAN AREA CLINIC 13 | Facility: CLINIC | Age: 65
End: 2025-02-25
Payer: MEDICAID

## 2025-02-25 DIAGNOSIS — E11.3521 TYPE 2 DIAB W PROLIF DIAB RTNOP W TRCTN DTCH MACULA, R EYE: ICD-10-CM

## 2025-02-25 DIAGNOSIS — E11.3513 TYPE 2 DIABETES MELLITUS WITH PROLIFERATIVE DIABETIC RETINOPATHY WITH MACULAR EDEMA, BILATERAL: Primary | ICD-10-CM

## 2025-02-25 DIAGNOSIS — H40.9 GLAUCOMA: ICD-10-CM

## 2025-02-25 DIAGNOSIS — H11.31 SUBCONJUNCTIVAL HEMORRHAGE OF RIGHT EYE: ICD-10-CM

## 2025-02-25 DIAGNOSIS — H11.003 UNSPECIFIED PTERYGIUM OF EYE, BILATERAL: ICD-10-CM

## 2025-02-25 PROCEDURE — 92014 COMPRE OPH EXAM EST PT 1/>: CPT | Performed by: OPHTHALMOLOGY

## 2025-02-25 PROCEDURE — 92134 CPTRZ OPH DX IMG PST SGM RTA: CPT | Performed by: OPHTHALMOLOGY

## 2025-02-25 ASSESSMENT — INTRAOCULAR PRESSURE
OD: 16
OS: 14

## 2025-05-27 ENCOUNTER — OFFICE VISIT (OUTPATIENT)
Dept: URBAN - METROPOLITAN AREA CLINIC 13 | Facility: CLINIC | Age: 65
End: 2025-05-27
Payer: MEDICAID

## 2025-05-27 DIAGNOSIS — E11.3521 TYPE 2 DIAB W PROLIF DIAB RTNOP W TRCTN DTCH MACULA, R EYE: ICD-10-CM

## 2025-05-27 DIAGNOSIS — H40.9 GLAUCOMA: ICD-10-CM

## 2025-05-27 DIAGNOSIS — H11.31 SUBCONJUNCTIVAL HEMORRHAGE OF RIGHT EYE: ICD-10-CM

## 2025-05-27 PROCEDURE — 92014 COMPRE OPH EXAM EST PT 1/>: CPT | Performed by: OPHTHALMOLOGY

## 2025-05-27 PROCEDURE — 92134 CPTRZ OPH DX IMG PST SGM RTA: CPT | Performed by: OPHTHALMOLOGY

## 2025-05-27 ASSESSMENT — INTRAOCULAR PRESSURE
OS: 12
OD: 12

## 2025-05-29 ENCOUNTER — OFFICE VISIT (OUTPATIENT)
Dept: URBAN - METROPOLITAN AREA CLINIC 13 | Facility: CLINIC | Age: 65
End: 2025-05-29
Payer: MEDICAID

## 2025-05-29 DIAGNOSIS — H11.003 UNSPECIFIED PTERYGIUM OF EYE, BILATERAL: ICD-10-CM

## 2025-05-29 DIAGNOSIS — E11.3513 TYPE 2 DIABETES MELLITUS WITH PROLIFERATIVE DIABETIC RETINOPATHY WITH MACULAR EDEMA, BILATERAL: Primary | ICD-10-CM

## 2025-05-29 PROCEDURE — 99214 OFFICE O/P EST MOD 30 MIN: CPT | Performed by: OPHTHALMOLOGY

## 2025-05-29 PROCEDURE — 92134 CPTRZ OPH DX IMG PST SGM RTA: CPT | Performed by: OPHTHALMOLOGY

## 2025-05-29 ASSESSMENT — INTRAOCULAR PRESSURE
OS: 14
OD: 16